# Patient Record
Sex: MALE | Race: WHITE | Employment: UNEMPLOYED | URBAN - METROPOLITAN AREA
[De-identification: names, ages, dates, MRNs, and addresses within clinical notes are randomized per-mention and may not be internally consistent; named-entity substitution may affect disease eponyms.]

---

## 2022-08-05 NOTE — PROGRESS NOTES
New Patient Abstract    Reason for Referral: Chronic embolism and thrombosis of deep veins of unspecified upper extremity    Referring Provider: Yari Funes MD    Primary Care Provider: Yari Funes MD    Family History of Cancer/Hematologic Disorders: None reported    Presenting Symptoms: Clusters of small mobile, non-tender nodules in bilateral upper arms    Narrative with recent with Results/Procedures/Biopsies and Dates completed: Mr. Catalina Morrison is a 23-year-old white male with a history of depression and anxiety. He presented to his PCP on 6/2/22 reporting non-painful lumps under the skin in both upper arms. Physical assessment confirmed the presence of clusters of small mobile, non-tender nodules in bilateral upper arms with more noted on LUE than RUE. Ultrasound of the left upper arm was ordered and performed on 7/5/22 revealing probable chronic thrombus in the anterior brachial vein over a 10-15 cm segment with patency otherwise noted in the LUE. PCP noted that patient was asymptomatic, but etiology of clot was unknown. Patient does not smoke and has no documented family or personal history of DVT. Of note, patient did receive one dose of the J&J COVID shot on 6/22/21. Mr. Catalina Morrison is now referred to [facility], per primary care, for hematology evaluation and treatment of chronic DVT of upper extremity. Patient was not started on anticoagulation. US- UPPER VENOUS LEFT 7/5/22        Notes from Referring Provider: probable chronic thrombus Left anterior brachial vein- asymptomatic but no known reason for clot.     Other Pertinent Information:       Presented at Tumor Board:  N/A

## 2022-08-09 ENCOUNTER — OFFICE VISIT (OUTPATIENT)
Dept: ONCOLOGY | Age: 22
End: 2022-08-09
Payer: COMMERCIAL

## 2022-08-09 ENCOUNTER — HOSPITAL ENCOUNTER (OUTPATIENT)
Dept: LAB | Age: 22
Discharge: HOME | End: 2022-08-12
Payer: COMMERCIAL

## 2022-08-09 VITALS
SYSTOLIC BLOOD PRESSURE: 130 MMHG | HEART RATE: 78 BPM | BODY MASS INDEX: 33.53 KG/M2 | OXYGEN SATURATION: 100 % | WEIGHT: 213.6 LBS | HEIGHT: 67 IN | DIASTOLIC BLOOD PRESSURE: 80 MMHG | RESPIRATION RATE: 21 BRPM | TEMPERATURE: 98 F

## 2022-08-09 DIAGNOSIS — I82.622 ACUTE DEEP VEIN THROMBOSIS (DVT) OF BRACHIAL VEIN OF LEFT UPPER EXTREMITY (HCC): ICD-10-CM

## 2022-08-09 DIAGNOSIS — I82.622 DEEP VEIN THROMBOSIS (DVT) OF LEFT UPPER EXTREMITY, UNSPECIFIED CHRONICITY, UNSPECIFIED VEIN (HCC): Primary | ICD-10-CM

## 2022-08-09 DIAGNOSIS — R22.9 LUMPS ON THE SKIN: ICD-10-CM

## 2022-08-09 DIAGNOSIS — I82.622 DEEP VEIN THROMBOSIS (DVT) OF LEFT UPPER EXTREMITY, UNSPECIFIED CHRONICITY, UNSPECIFIED VEIN (HCC): ICD-10-CM

## 2022-08-09 PROBLEM — I82.629 ACUTE DEEP VEIN THROMBOSIS (DVT) OF BRACHIAL VEIN (HCC): Status: ACTIVE | Noted: 2022-08-09

## 2022-08-09 LAB
ABO + RH BLD: NORMAL
ALBUMIN SERPL-MCNC: 4.2 G/DL (ref 3.5–5)
ALBUMIN/GLOB SERPL: 1.3 {RATIO} (ref 1.2–3.5)
ALP SERPL-CCNC: 52 U/L (ref 50–136)
ALT SERPL-CCNC: 46 U/L (ref 12–65)
ANION GAP SERPL CALC-SCNC: 6 MMOL/L (ref 7–16)
APTT 1H P INC PPP: NORMAL SEC
APTT IMM NP PPP: NORMAL SEC
APTT PPP: 22.1 SEC (ref 24.1–35.1)
APTT PPP: 25.1 SEC (ref 23.4–34.5)
AST SERPL-CCNC: 20 U/L (ref 15–37)
BASOPHILS # BLD: 0.1 K/UL (ref 0–0.2)
BASOPHILS NFR BLD: 1 % (ref 0–2)
BILIRUB SERPL-MCNC: 0.9 MG/DL (ref 0.2–1.1)
BUN SERPL-MCNC: 15 MG/DL (ref 6–23)
CALCIUM SERPL-MCNC: 9.5 MG/DL (ref 8.3–10.4)
CHLORIDE SERPL-SCNC: 105 MMOL/L (ref 98–107)
CO2 SERPL-SCNC: 27 MMOL/L (ref 21–32)
CREAT SERPL-MCNC: 1.1 MG/DL (ref 0.8–1.5)
D DIMER PPP FEU-MCNC: <0.27 UG/ML(FEU)
DIFFERENTIAL METHOD BLD: ABNORMAL
EOSINOPHIL # BLD: 0.2 K/UL (ref 0–0.8)
EOSINOPHIL NFR BLD: 3 % (ref 0.5–7.8)
ERYTHROCYTE [DISTWIDTH] IN BLOOD BY AUTOMATED COUNT: 11.6 % (ref 11.9–14.6)
FIBRINOGEN PPP-MCNC: 250 MG/DL (ref 190–501)
GLOBULIN SER CALC-MCNC: 3.2 G/DL (ref 2.3–3.5)
GLUCOSE SERPL-MCNC: 82 MG/DL (ref 65–100)
HCT VFR BLD AUTO: 47.2 %
HGB BLD-MCNC: 16.4 G/DL (ref 13.6–17.2)
IMM GRANULOCYTES # BLD AUTO: 0 K/UL (ref 0–0.5)
IMM GRANULOCYTES NFR BLD AUTO: 0 % (ref 0–5)
INR PPP: 1.1
LYMPHOCYTES # BLD: 1.3 K/UL (ref 0.5–4.6)
LYMPHOCYTES NFR BLD: 26 % (ref 13–44)
Lab: NORMAL
MCH RBC QN AUTO: 31.4 PG (ref 26.1–32.9)
MCHC RBC AUTO-ENTMCNC: 34.7 G/DL (ref 31.4–35)
MCV RBC AUTO: 90.2 FL (ref 79.6–97.8)
MONOCYTES # BLD: 0.6 K/UL (ref 0.1–1.3)
MONOCYTES NFR BLD: 12 % (ref 4–12)
NEUTS SEG # BLD: 2.8 K/UL (ref 1.7–8.2)
NEUTS SEG NFR BLD: 57 % (ref 43–78)
NRBC # BLD: 0 K/UL (ref 0–0.2)
PLATELET # BLD AUTO: 210 K/UL (ref 150–450)
PMV BLD AUTO: 12 FL (ref 9.4–12.3)
POTASSIUM SERPL-SCNC: 3.8 MMOL/L (ref 3.5–5.1)
PROT SERPL-MCNC: 7.4 G/DL (ref 6.3–8.2)
PROTHROMBIN TIME: 13.9 SEC (ref 12.6–14.5)
PTT MIXING STUDY: NORMAL
RBC # BLD AUTO: 5.23 M/UL (ref 4.23–5.6)
REFERENCE LAB: NORMAL
SODIUM SERPL-SCNC: 138 MMOL/L (ref 136–145)
THROMBIN TIME: 22 SECS (ref 15.4–17.9)
WBC # BLD AUTO: 4.8 K/UL (ref 4.3–11.1)

## 2022-08-09 PROCEDURE — 85302 CLOT INHIBIT PROT C ANTIGEN: CPT

## 2022-08-09 PROCEDURE — 85730 THROMBOPLASTIN TIME PARTIAL: CPT

## 2022-08-09 PROCEDURE — 85303 CLOT INHIBIT PROT C ACTIVITY: CPT

## 2022-08-09 PROCEDURE — 85240 CLOT FACTOR VIII AHG 1 STAGE: CPT

## 2022-08-09 PROCEDURE — 85384 FIBRINOGEN ACTIVITY: CPT

## 2022-08-09 PROCEDURE — 80053 COMPREHEN METABOLIC PANEL: CPT

## 2022-08-09 PROCEDURE — 83695 ASSAY OF LIPOPROTEIN(A): CPT

## 2022-08-09 PROCEDURE — 86146 BETA-2 GLYCOPROTEIN ANTIBODY: CPT

## 2022-08-09 PROCEDURE — 83090 ASSAY OF HOMOCYSTEINE: CPT

## 2022-08-09 PROCEDURE — 81240 F2 GENE: CPT

## 2022-08-09 PROCEDURE — 86147 CARDIOLIPIN ANTIBODY EA IG: CPT

## 2022-08-09 PROCEDURE — 99205 OFFICE O/P NEW HI 60 MIN: CPT | Performed by: PEDIATRICS

## 2022-08-09 PROCEDURE — 85379 FIBRIN DEGRADATION QUANT: CPT

## 2022-08-09 PROCEDURE — 86900 BLOOD TYPING SEROLOGIC ABO: CPT

## 2022-08-09 PROCEDURE — 85300 ANTITHROMBIN III ACTIVITY: CPT

## 2022-08-09 PROCEDURE — 81241 F5 GENE: CPT

## 2022-08-09 PROCEDURE — 86769 SARS-COV-2 COVID-19 ANTIBODY: CPT

## 2022-08-09 PROCEDURE — 85670 THROMBIN TIME PLASMA: CPT

## 2022-08-09 PROCEDURE — 85732 THROMBOPLASTIN TIME PARTIAL: CPT

## 2022-08-09 PROCEDURE — 36415 COLL VENOUS BLD VENIPUNCTURE: CPT

## 2022-08-09 PROCEDURE — 86038 ANTINUCLEAR ANTIBODIES: CPT

## 2022-08-09 PROCEDURE — 85305 CLOT INHIBIT PROT S TOTAL: CPT

## 2022-08-09 PROCEDURE — 85306 CLOT INHIBIT PROT S FREE: CPT

## 2022-08-09 PROCEDURE — 85025 COMPLETE CBC W/AUTO DIFF WBC: CPT

## 2022-08-09 PROCEDURE — 85610 PROTHROMBIN TIME: CPT

## 2022-08-09 RX ORDER — ALPRAZOLAM 1 MG/1
1 TABLET ORAL DAILY
COMMUNITY

## 2022-08-09 RX ORDER — SERTRALINE HYDROCHLORIDE 100 MG/1
100 TABLET, FILM COATED ORAL DAILY
COMMUNITY

## 2022-08-09 ASSESSMENT — PATIENT HEALTH QUESTIONNAIRE - PHQ9
SUM OF ALL RESPONSES TO PHQ9 QUESTIONS 1 & 2: 2
SUM OF ALL RESPONSES TO PHQ QUESTIONS 1-9: 2
2. FEELING DOWN, DEPRESSED OR HOPELESS: 1
SUM OF ALL RESPONSES TO PHQ QUESTIONS 1-9: 2
SUM OF ALL RESPONSES TO PHQ QUESTIONS 1-9: 2
1. LITTLE INTEREST OR PLEASURE IN DOING THINGS: 1
SUM OF ALL RESPONSES TO PHQ QUESTIONS 1-9: 2

## 2022-08-09 NOTE — PATIENT INSTRUCTIONS
Patient Instructions from Today's Visit    Reason for Visit:  New patient visit for thrombosis (DVT) left arm  (Incidental finding while evaluating \"lumps on arms for years\": Ultrasound in July 2022)  Was placed on Aspirin 81mg by mouth daily and referred to Hematology       Plan:      Dr Lyla Boast feels that the \"lumps\" that are felt on your arms are fatty tissue/subcutaneous tissue. He does not think they are lymph nodes. The blood clot they found on ultrasound of your left arm is in a deep vein. This is called a deep vein thrombosis. Typically the recommendation for treatment would be to be treated with anticoagulation/blood thinner. It looks like it has been there for a while called chronic clot. Dr Lyla Boast recommends a lab work up looking to evaluate why you may have had a clot in the first place. Dr Lyla Boast would like you to have a chest xray. He would also like to reeavaluate your left arm but also get an ultrasound of your right arm too. You can have those done this week. If your ddimer is positive he would recommend treating you with anticoagulation for 3 months. If your ddimer is normal, and your ultrasound is not worse than your last check then likely just continue aspirin. Follow Up:  1 month     Recent Lab Results:  Labs after visit today    Treatment Summary has been discussed and given to patient: NA        -------------------------------------------------------------------------------------------------------------------  Please call our office at [phone] if you have any  of the following symptoms:   Fever of 100.5 or greater  Chills  Shortness of breath  Swelling or pain in one leg    After office hours an answering service is available and will contact a provider for emergencies or if you are experiencing any of the above symptoms. Patient has My Chart. My Chart log in information explained on the after visit summary printout at the [facility] desk.

## 2022-08-09 NOTE — PROGRESS NOTES
HISTORY OF PRESENT ILLNESS  Luis Barker is a 19 y.o. y.o. male with VTE    ABSTRACT  New Patient Abstract    Reason for Referral: Chronic embolism and thrombosis of deep veins of unspecified upper extremity    Referring Provider: Dustin Madrid MD    Primary Care Provider: Dustin Madrid MD    Family History of Cancer/Hematologic Disorders: None reported    Presenting Symptoms: Clusters of small mobile, non-tender nodules in bilateral upper arms    Narrative with recent with Results/Procedures/Biopsies and Dates completed: Mr. Kyra Hull is a 22-year-old white male with a history of depression and anxiety. He presented to his PCP on 6/2/22 reporting non-painful lumps under the skin in both upper arms. Physical assessment confirmed the presence of clusters of small mobile, non-tender nodules in bilateral upper arms with more noted on LUE than RUE. Ultrasound of the left upper arm was ordered and performed on 7/5/22 revealing probable chronic thrombus in the anterior brachial vein over a 10-15 cm segment with patency otherwise noted in the LUE. PCP noted that patient was asymptomatic, but etiology of clot was unknown. Patient does not smoke and has no documented family or personal history of DVT. Of note, patient did receive one dose of the J&J COVID shot on 6/22/21. Mr. Kyra Hull is now referred to [facility], per primary care, for hematology evaluation and treatment of chronic DVT of upper extremity. Patient was not started on anticoagulation. US- UPPER VENOUS LEFT 7/5/22        Notes from Referring Provider: probable chronic thrombus Left anterior brachial vein- asymptomatic but no known reason for clot. 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 sertraline (ZOLOFT) 100 MG tablet Take 100 mg by mouth in the morning. No current facility-administered medications for this visit. No past medical history on file. No past surgical history on file. No family history on file. Social History     Tobacco Use    Smoking status: Never    Smokeless tobacco: Never   Substance Use Topics    Alcohol use: Never    Drug use: Never       Immunization History   Administered Date(s) Administered    Influenza Quadv 11/03/2015       No Known Allergies      Review of Systems   Review of Systems   Constitutional: Negative. HENT: Negative. Eyes: Negative. Respiratory: Negative. Cardiovascular: Negative. Gastrointestinal: Negative. Genitourinary: Negative. Musculoskeletal: Negative. Skin: Negative. Neurological: Negative. Endo/Heme/Allergies: Negative. Psychiatric/Behavioral: Negative. Pain reviewed fully and addressed this visit  Med review and reconciliation addressed fully this visit  ADLs and performance level addressed, ECOG 0 unless otherwise addressed    Blood pressure 130/80, pulse 78, temperature 98 °F (36.7 °C), temperature source Oral, resp. rate 21, height 5' 7.32\" (1.71 m), weight 213 lb 9.6 oz (96.9 kg), SpO2 100 %. Physical Exam:   Constitutional: Well developed, well nourished male in no acute distress, sitting comfortably   HEENT: Normocephalic and atraumatic.  Oropharynx is clear, mucous membranes are moist.  Pupils are equal, round, and reactive to light. Extraocular muscles are intact. Sclerae anicteric. Neck supple without JVD. No thyromegaly present. Lymph node   No palpable submandibular, cervical, supraclavicular, axillary or inguinal lymph nodes. Skin Warm and dry. No bruising and no rash noted. No erythema. No pallor.   -mild prominent surface veins chest wall, right >left   Respiratory Lungs are clear to auscultation bilaterally without wheezes, rales or rhonchi, normal air exchange without accessory muscle use. CVS Normal rate, regular rhythm and normal S1 and S2. No murmurs, gallops, or rubs. Abdomen Soft, nontender and nondistended, normoactive bowel sounds. No palpable mass. No hepatosplenomegaly. Neuro Grossly nonfocal with no obvious sensory or motor deficits. MSK Normal range of motion in general.  No edema and no tenderness. PS ECOG = 0   Psych Appropriate mood and affect. No visits with results within 3 Day(s) from this visit. Latest known visit with results is:   No results found for any previous visit. Radiology:  CT Results (most recent):  No results found for this or any previous visit from the past 365 days. PET Results (most recent):  No results found for this or any previous visit from the past 365 days. JANE Results (most recent):  No results found for this or any previous visit from the past 365 days. US  No results found for this or any previous visit from the past 365 days.          Patient Active Problem List   Diagnosis    Anxiety    Acute deep vein thrombosis (DVT) of brachial vein (HCC)   Likely chronic by US and lack of symptoms    ASSESSMENT and PLAN  18 yo with incidental finding of what appears to be chronic extended DVT of left anterior brachial v with no antecedent injury or associated abnormalities c/w paget schroetter (weight lifting, physical activity, discernable malformation, cxr pending) on ASA 81 mg after discovery of clot on US for superficial lumps bilateral.  1) chronic DVT LUE: rule out paget schroetter (cervical rib or other abnormality causing compression)  -repeat bilateral UE US given 6 weeks since last and pt.  Has prominent vasculature suggesting collateral formation; consider CTA or MRV of chest pending eval  2) thrombophilia testing recommended and discussed  3) continue ASA  -if US shows progression, switch to DOAC  -if DDIMER elevated, switch to [id number]    Reviewed symptoms of DVT and PE and advised to call if any; prevention and high risk situations discussed    Rtc 1 month  Total time 70 min 50% in direct consultation about the patient's diagnosis and management  Antoinette Pop MD  Director, Adolescent Young Adult [address] [facility] and [facility]  [address] [facility]  [address] [facility], [address] [place] [address] Phone

## 2022-08-10 LAB
HCYS SERPL-SCNC: 9.2 UMOL/L (ref 0–14.5)
LPA SERPL-SCNC: 204.9 NMOL/L

## 2022-08-11 ENCOUNTER — HOSPITAL ENCOUNTER (OUTPATIENT)
Dept: ULTRASOUND IMAGING | Age: 22
Discharge: HOME | End: 2022-08-14

## 2022-08-11 ENCOUNTER — CLINICAL DOCUMENTATION (OUTPATIENT)
Dept: ONCOLOGY | Age: 22
End: 2022-08-11

## 2022-08-11 ENCOUNTER — HOSPITAL ENCOUNTER (OUTPATIENT)
Dept: GENERAL RADIOLOGY | Age: 22
Discharge: HOME | End: 2022-08-14

## 2022-08-11 DIAGNOSIS — R22.9 LUMPS ON THE SKIN: ICD-10-CM

## 2022-08-11 DIAGNOSIS — I82.622 ACUTE DEEP VEIN THROMBOSIS (DVT) OF BRACHIAL VEIN OF LEFT UPPER EXTREMITY (HCC): ICD-10-CM

## 2022-08-11 DIAGNOSIS — I82.622 DEEP VEIN THROMBOSIS (DVT) OF LEFT UPPER EXTREMITY, UNSPECIFIED CHRONICITY, UNSPECIFIED VEIN (HCC): ICD-10-CM

## 2022-08-11 LAB
B2 GLYCOPROT1 IGA SER-ACNC: <9 GPI IGA UNITS (ref 0–25)
B2 GLYCOPROT1 IGG SER-ACNC: <9 GPI IGG UNITS (ref 0–20)
B2 GLYCOPROT1 IGM SER-ACNC: <9 GPI IGM UNITS (ref 0–32)
CARDIOLIPIN IGA SER IA-ACNC: <9 APL U/ML (ref 0–11)
CARDIOLIPIN IGG SER IA-ACNC: 9 GPL U/ML (ref 0–14)
CARDIOLIPIN IGM SER IA-ACNC: <9 MPL U/ML (ref 0–12)
FACT VIII ACT/NOR PPP: 111 % (ref 56–140)
INTERPRETATION: NORMAL
PROT C AG PPP IA-ACNC: 90 % (ref 60–150)
VWF AG ACT/NOR PPP IA: 95 % (ref 50–200)
VWF:RCO ACT/NOR PPP PL AGG: 73 % (ref 50–200)

## 2022-08-11 NOTE — PROGRESS NOTES
Stat call from [place] [id number] in [address]:  patient is Negative for DVT in both arms. Msg to Dr Stephanie Davis and Maxine Elizabeth.

## 2022-08-12 LAB
F5 P.R506Q BLD/T QL: NORMAL
SARS-COV-2 AB SERPL QL IA: NEGATIVE

## 2022-08-15 LAB
ANA SER QL: NEGATIVE
F2 GENE MUT ANL BLD/T: NORMAL

## 2022-08-18 LAB
APTT SCREEN TO CONFIRM RATIO: 1.2 RATIO (ref 0–1.34)
AT III AG PPP IA-ACNC: 91 % (ref 72–124)
AT III PPP CHRO-ACNC: 108 % (ref 75–135)
CONFIRM APTT/NORMAL: 46.3 SEC (ref 0–47.6)
FACT VIII ACT/NOR PPP: 102 % (ref 56–140)
LA 2 SCREEN W REFLEX-IMP: NORMAL
PROT C PPP-ACNC: 105 % (ref 73–180)
PROT S ACT/NOR PPP: 102 % (ref 63–140)
PROT S AG ACT/NOR PPP IA: 86 % (ref 60–150)
PROT S FREE AG ACT/NOR PPP IA: 107 % (ref 61–136)
SCREEN APTT: 32.9 SEC (ref 0–51.9)
SCREEN DRVVT: 43.7 SEC (ref 0–47)
THROMBIN TIME: 18.8 SEC (ref 0–23)

## 2022-09-15 ENCOUNTER — OFFICE VISIT (OUTPATIENT)
Dept: ONCOLOGY | Age: 22
End: 2022-09-15
Payer: COMMERCIAL

## 2022-09-15 VITALS
HEIGHT: 67 IN | HEART RATE: 92 BPM | SYSTOLIC BLOOD PRESSURE: 145 MMHG | RESPIRATION RATE: 14 BRPM | OXYGEN SATURATION: 99 % | BODY MASS INDEX: 33.54 KG/M2 | TEMPERATURE: 98.5 F | WEIGHT: 213.7 LBS | DIASTOLIC BLOOD PRESSURE: 83 MMHG

## 2022-09-15 DIAGNOSIS — I82.622 DEEP VEIN THROMBOSIS (DVT) OF LEFT UPPER EXTREMITY, UNSPECIFIED CHRONICITY, UNSPECIFIED VEIN (HCC): Primary | ICD-10-CM

## 2022-09-15 PROCEDURE — 99214 OFFICE O/P EST MOD 30 MIN: CPT | Performed by: PEDIATRICS

## 2022-09-15 ASSESSMENT — PATIENT HEALTH QUESTIONNAIRE - PHQ9
SUM OF ALL RESPONSES TO PHQ9 QUESTIONS 1 & 2: 0
SUM OF ALL RESPONSES TO PHQ QUESTIONS 1-9: 0
1. LITTLE INTEREST OR PLEASURE IN DOING THINGS: 0
2. FEELING DOWN, DEPRESSED OR HOPELESS: 0
SUM OF ALL RESPONSES TO PHQ QUESTIONS 1-9: 0

## 2022-09-15 NOTE — PROGRESS NOTES
Celia Joy  (: 41-) Thrombosis Lab Flow Sheet   First Tier 2022   Prothrombin [id number] Mutation Negative   Factor 5 Leiden Negative   Lupus Anticoag panel Not Detected   Homocysteine 9.2   Lipoprotein (a) 204.9 (H)   Protein C, Ag 90   Protein C, Act 105   Protein S Ag, Free 107   Protein S, Act (functional) 102   Protein S, Total 86   Cardiolipin Ab, IgG 9   Cardiolipin Ab, IgM <9   Cardiolipin Ab, IgA <9   Antithrombin Activity 108   Antithrombin Antigen 91   Beta-2 Glycoprotein I Ab, IgG <9   Beta-2 Glycoprotein I Ab, IgM <9   Beta-2 Glycoprotein I Ab, IgA <9   Ddimer <.27   Factor 8 Activity 102   Kendra, Direct Negative   Fibrinogen 250   Thrombin Time 22 (H)   Blood Type A+   Ángel 2 Negative   CALR Negative   MPL Negative   BCR ABL Negative   SARS-COVID Ab Total (IgG, IgM, IgA) Negative       Second Tier    Alpha 2 Plasmin Inhibitor    Plasminogen Activity    Plasminogen Activator Inhibitor 1

## 2022-09-15 NOTE — PROGRESS NOTES
HISTORY OF PRESENT ILLNESS  Mortimer Samuel is a 19 y.o. y.o. male with VTE    ABSTRACT  New Patient Abstract    Reason for Referral: Chronic embolism and thrombosis of deep veins of unspecified upper extremity    Referring Provider: Stefany Blackwood MD    Primary Care Provider: Stefany Blackwood MD    Family History of Cancer/Hematologic Disorders: None reported    Presenting Symptoms: Clusters of small mobile, non-tender nodules in bilateral upper arms    Narrative with recent with Results/Procedures/Biopsies and Dates completed: Mr. Sally Cisneros is a 40-year-old white male with a history of depression and anxiety. He presented to his PCP on 6/2/22 reporting non-painful lumps under the skin in both upper arms. Physical assessment confirmed the presence of clusters of small mobile, non-tender nodules in bilateral upper arms with more noted on LUE than RUE. Ultrasound of the left upper arm was ordered and performed on 7/5/22 revealing probable chronic thrombus in the anterior brachial vein over a 10-15 cm segment with patency otherwise noted in the LUE. PCP noted that patient was asymptomatic, but etiology of clot was unknown. Patient does not smoke and has no documented family or personal history of DVT. Of note, patient did receive one dose of the J&J COVID shot on 6/22/21. Mr. Sally Cisneros is now referred to [facility], per primary care, for hematology evaluation and treatment of chronic DVT of upper extremity. Patient was not started on anticoagulation. US- UPPER VENOUS LEFT 7/5/22        Notes from Referring Provider: probable chronic thrombus Left anterior brachial vein- asymptomatic but no known reason for clot. 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 sertraline (ZOLOFT) 100 MG tablet Take 100 mg by mouth in the morning. No current facility-administered medications for this visit. No past medical history on file. No past surgical history on file. Family History   Problem Relation Age of Onset    Transient ischemic attack  Mother     Obesity Father     Deep Vein Thrombosis Paternal Grandfather        Social History     Tobacco Use    Smoking status: Never    Smokeless tobacco: Never   Substance Use Topics    Alcohol use: Never    Drug use: Never       Immunization History   Administered Date(s) Administered    Influenza Quadv 11/03/2015       No Known Allergies      Review of Systems   Review of Systems   Constitutional: Negative. HENT: Negative. Eyes: Negative. Respiratory: Negative. Cardiovascular: Negative. Gastrointestinal: Negative. Genitourinary: Negative. Musculoskeletal: Negative. Skin: Negative. Neurological: Negative. Endo/Heme/Allergies: Negative. Psychiatric/Behavioral: Negative. Pain reviewed fully and addressed this visit  Med review and reconciliation addressed fully this visit  ADLs and performance level addressed, ECOG 0 unless otherwise addressed    Blood pressure (!) 145/83, pulse 92, temperature 98.5 °F (36.9 °C), temperature source Oral, resp. rate 14, height 5' 7.32\" (1.71 m), weight 213 lb 11.2 oz (96.9 kg), SpO2 99 %.         Physical Exam:   Constitutional: Well developed, well nourished male in no acute distress, sitting comfortably   HEENT: Normocephalic and atraumatic. Oropharynx is clear, mucous membranes are moist.  Pupils are equal, round, and reactive to light. Extraocular muscles are intact. Sclerae anicteric. Neck supple without JVD. No thyromegaly present. Lymph node   No palpable submandibular, cervical, supraclavicular, axillary or inguinal lymph nodes. Skin Warm and dry. No bruising and no rash noted. No erythema. No pallor.   -mild prominent surface veins chest wall, right >left   Respiratory Lungs are clear to auscultation bilaterally without wheezes, rales or rhonchi, normal air exchange without accessory muscle use. CVS Normal rate, regular rhythm and normal S1 and S2. No murmurs, gallops, or rubs. Abdomen Soft, nontender and nondistended, normoactive bowel sounds. No palpable mass. No hepatosplenomegaly. Neuro Grossly nonfocal with no obvious sensory or motor deficits. MSK Normal range of motion in general.  No edema and no tenderness. PS ECOG = 0   Psych Appropriate mood and affect. No visits with results within 3 Day(s) from this visit. Latest known visit with results is:   Hospital Outpatient Visit on 08/09/2022   Component Date Value Ref Range Status    DILUTE PROTHROMBIN TIME (dPT) 08/09/2022 46.3  0.0 - 47.6 sec Final    Dilute Prothrombin Time Confirm Ra* 08/09/2022 1.20  0.00 - 1.34 Ratio Final    Thrombin Time 08/09/2022 18.8  0.0 - 23.0 sec Final    PTT-LA 08/09/2022 32.9  0.0 - 51.9 sec Final    dRVVT 08/09/2022 43.7  0.0 - 47.0 sec Final    Comment: (NOTE)  Performed At: [facility] & [address] [id number]  Tali Amato MD WS:[id number]      Interpretation 08/09/2022 Comment:   Final    Comment: (NOTE)  No lupus anticoagulant was detected.   Performed At: [facility] & [facility] [address] [place], [place] [id number]  Tali Amato MD ML:[id number]      Factor II, DNA Analysis 08/09/2022 Comment    Final    Comment: (NOTE)  Result: c.*97G>A - Not Detected  This result is not associated with an increased risk for venous  thromboembolism. See Additional Clinical Information and  Comments. Additional Clinical Information:  Venous thromboembolism is a multifactorial disease influenced by  genetic, environmental, and circumstantial risk factors. The c.*97G>A  variant in the F2 gene is a genetic risk factor for venous  thromboembolism. Heterozygous carriers have a 2- to 4-fold increased  risk for venous thromboembolism. Homozygotes for the c.*97G>A variant  are rare. The annual risk of VTE in homozygotes has been reported to  be 1.1%/year. Individuals who carry both a c.*97G>A variant in the  F2 gene and a c.1601G>A (p. Krv386Qco) variant in the F5 gene  (commonly referred to as Factor V Leiden) have an approximately 20-  fold increased risk for venous thromboembolism. Risks are likely to  be even higher in more complex genotype combinations involving the  F2 c.*97G>A variant and Factor V Leiden (PMID:                            03436105). Additional  risk factors include but are not limited to: deficiency of protein C,  protein S, or antithrombin III, age, male sex, personal or family  history of deep vein thromboembolism, smoking, surgery, prolonged  immobilization, malignant neoplasm, tamoxifen treatment, raloxifene  treatment, oral contraceptive use, hormone replacement therapy, and  pregnancy. Management of thrombotic risk and thrombotic events should  follow established guidelines and fit the clinical circumstance. This  result cannot predict the occurrence or recurrence of a thrombotic  event. Comments:  Genetic counseling is recommended to discuss the potential clinical  implications of positive results, as well as recommendations for  testing family members. Genetic Coordinators are available for health care providers to  discuss  results at [phone] ([phone]).   Test Details:  Variant analyzed: c.*97G>A, previously referred to as [id number]  Methods/Limitations:  DNA analysis of the F2 gene (NM_000                           506.5) was performed by PCR  amplification followed by restriction enzyme analysis. The diagnostic  sensitivity is >99%. Results must be combined with clinical  information for the most accurate interpretation. Molecular-based  testing is highly accurate, but as in any laboratory test, diagnostic  errors may occur. False positive or false negative results may occur  for reasons that include genetic variants, blood transfusions, bone  marrow transplantation, somatic or tissue-specific mosaicism,  mislabeled samples, or erroneous representation of family  relationships. This test was developed and its performance characteristics determined  by Joel Howell. It has not been cleared or approved by the Food and Drug  Administration. References:  Brittney Hankinser, Sabrina Smoker; [facility] Professional  Practice and Guidelines Committee. Addendum: Edward Armstrong. consensus statement on factor V Leiden mutation  testing. [employer]. 2021 Mar 5. doi: 46.5973/U612                           -77892-x. PMID: 48787000. Don Ford. Prothrombin Thrombophilia. 2006 Jul 25  [Updated 2021 Feb 4]. In: Rosamaria Collier, Parish HH, Destini RA, et al.,  editors. Celi(R) [Internet]. [facility]): [facility], [facility]; [phone]. Available from:  [facility].  Ti Phlegm, Deena Dukes CS;  [facility] Laboratory  Committee. Venous thromboembolism  laboratory testing (factor V Leiden and factor II c.*97G>A),  2018 update: a technical standard of the [address] ([facility]). [employer]. 2018 Dec;20(12):4563-7522.  doi: 21.3056/[id number]-K. Epub 2018 Oct 5. PMID: 07182004.   Dexter Justin, PhD, Constantino Grimaldo, PhD  Fred Frausto, PhD, Obed Ghosh, PhD, Bill Aase Rahda, PhD, Ryan Arzate, PhD, Fior Altman, PhD, Jonathon Mead, PhD, [facility] Center[facility].  Performed At: [place] [id number]  Diane Coelho [facility] XD:[id number]      Factor V Leiden Mutation 08/09/2022 Comment    Final    Comment: (NOTE)  Result: c.1601G>A (p.Ipo904Ytq) - Not Detected  This result is not associated with an increased risk for venous  thromboembolism. See Additional Clinical Information and  Comments. Additional Clinical Information:  Venous thromboembolism is a multifactorial disease influenced by  genetic, environmental, and circumstantial risk factors. The  c.1601G>A (p. Cvu484Cth) variant in the F5 gene, commonly referred to  as Factor V Leiden, is a genetic risk factor for venous  thromboembolism. Heterozygous carriers of this variant have a 6- to 8-  fold increased risk for venous thromboembolism. Individuals  homozygous for this variant (ie, with a copy of the variant on each  chromosome) have an approximately 80-fold increased risk for venous  thromboembolism. Individuals who carry both a c.*97G>A variant in the  F2 gene and Factor V Leiden have an approximately 20-fold increased  risk for venous thromboembolism. Risks are likely to be even higher  in more complex genotype combinations in                           volving the F2 c.*97G>A  variant and Factor V Leiden (PMID: 65479510). Additional risk factors  include but are not limited to: deficiency of protein C, protein S,  or antithrombin III, age, male sex, personal or family history of  deep vein thromboembolism, smoking, surgery, prolonged  immobilization, malignant neoplasm, tamoxifen treatment, raloxifene  treatment, oral contraceptive use, hormone replacement therapy, and  pregnancy. Management of thrombotic risk and thrombotic events should  follow established guidelines and fit the clinical circumstance.  This  result cannot predict the occurrence or recurrence of a thrombotic  event. Comment:  Genetic counseling is recommended to discuss the potential clinical  implications of positive results, as well as recommendations for  testing family members. Genetic Coordinators are available for health care providers to  discuss results at [phone] Test Details:  Variant Analyzed: c.1601G>A (p. [id number]), referred to as Fact                           or V  Leiden  Methods/Limitations:  DNA analysis of the F5 gene (NM_000130.5) was performed by PCR  amplification followed by restriction enzyme analysis. The  diagnostic sensitivity is >99%. Results must be combined with  clinical information for the most accurate interpretation. Molecular-  based testing is highly accurate, but as in any laboratory test,  diagnostic errors may occur. False positive or false negative results  may occur for reasons that include genetic variants, blood  transfusions, bone marrow transplantation, somatic or tissue-specific  mosaicism, mislabeled samples, or erroneous representation of family  relationships. This test was developed and its performance characteristics  determined by Joel Howell. It has not been cleared or approved by the  Food and Drug Administration. References:  Sabrina Rudd; [facility]  Professional Practice and Guidelines Committee. Addendum: [address] consensus statement on fac                           tor V Leiden  mutation testing. [employer] [facility]. 2021 Mar 5. doi: 06.7543/[id number]-  77986-t. PMID: 01437001. Don Ford. Factor V Leiden Thrombophilia. 1999 May 14  [Updated 2018 Jan 4]. In: Rosamaria Collier, Parish HH, Destini RA, et al.,  editors. Celi(R) [Internet]. [place] [address] ([facility]): [facility], [place] [address]; 3531-4052.  Available from:  Armando Parra Athena Ka CS;  [facility] Laboratory  Committee. Venous thromboembolism  laboratory testing (factor V Leiden and factor II c.*97G>A), 2018  update: a technical standard of the [address] ([facility]). [employer] [facility]. 2018 Dec;20(12):5524-6589.  doi: 32.9043/E19751-311-7739-P. Epub 2018 Oct 5. PMID: 19038382. Connor Patterson, PhD, Zane Torres, PhD  Prasad Boyd, PhD, Bradley Bamberger, PhD, Bret Singer, PhD, Haily Chacon, PhD, Cristino Sandhoff, PhD, Gio Gómez,                            PhD, [facility]  Performed At: [address] [id number]  Emir Alxe [facility] OL:[id number]      Antithrombin Activity 08/09/2022 108  75 - 135 % Final    Comment: (NOTE)  Direct Xa inhibitor anticoagulants such as rivaroxaban, apixaban and  edoxaban will lead to spuriously elevated antithrombin activity  levels possibly masking a deficiency. Anti-Throm Ag 08/09/2022 91  72 - 124 % Final    Comment: (NOTE)  This test was developed and its performance characteristics  determined by Keily Mendez. It has not been cleared or approved  by the Food and Drug Administration. Performed At: [facility] & [address] [id number]  Caesar Shepherd MD ZI:[id number]      Protein S, Functional 08/09/2022 102  63 - 140 % Final    Comment: (NOTE)  Protein S activity may be falsely increased (masking an abnormal, low  result) in patients receiving direct Xa inhibitor (e.g.,  rivaroxaban, apixaban, edoxaban) or a direct thrombin inhibitor  (e.g., dabigatran) anticoagulant treatment due to assay interference  by these drugs.   Performed At: [address] [id number]  Caesar Shepherd MD FP:[id number]      Protein S Ag, Total 08/09/2022 86  60 - 150 % Final    Comment: (NOTE)  This test was developed and its performance characteristics  determined by Keily Mendez. It has not been cleared or approved  by the Food and Drug Administration. Protein S Ag, Free 08/09/2022 107  61 - 136 % Final    Comment: (NOTE)  Performed At: [facility] & [address] [id number]  Lamar Fajardo MD UJ:[id number]      Protein C Antigen 08/09/2022 90  60 - 150 % Final    Comment: (NOTE)  Performed At: [facility] & [address] [id number]  Lamar Fajardo MD OR:[id number]      Beta-2 Glyco 1 IgA 08/09/2022 <9  0 - 25 GPI IgA units Final    Comment: (NOTE)  The reference interval reflects a 3SD or 99th percentile interval,  which is thought to represent a potentially clinically significant  result in accordance with the International Consensus Statement on  the classification criteria for definitive antiphospholipid  syndrome (APS). J Thromb Haem 2006;4:295-306.   Performed At: [facility] & [address] [id number]  Lamar Fajardo MD YV:[id number]      Cardiolipin IgA 08/09/2022 <9  0 - 11 APL U/mL Final    Comment: (NOTE)                           Negative:              <12                           Indeterminate:     12 - 20                           Low-Med Positive: >20 - 80                           High Positive:         >80  Performed At: [facility] & [address] [id number]  Lamar Fajardo MD PJ:[id number]      Cardiolipin Antibody, IgG 08/09/2022 9  0 - 14 GPL U/mL Final    Comment: (NOTE)                           Negative:              <15                           Indeterminate:     15 - 20                           Low-Med Positive: >20 - 80                           High Positive:         >80      Cardiolipin Antibody, IgM 08/09/2022 <9  0 - 12 MPL U/mL Final    Comment: (NOTE)                           Negative:              <13                           Indeterminate:     13 - 20                           Low-Med Positive: >20 - 80                           High Positive:         >80  Performed At: [facility] [address] Randall Razia  [facility] [address] Rg and laboratory  findings is recommended. Serologic results should not be used as  the sole basis to diagnose or exclude recent SARS-CoV-2 infection. This test has not been FDA cleared or approved. This test has  been authorized by FDA under an Emergency Use Authorization  (EUA). This test is only authorized for the duration of the  declaration that circumstances exist justifying the authorization  of emergency use of in vitro diagnostics for detection and/or  diagnosis of COVID-19 under Section 564(b)(1) of the Act, 21  U. S.C. 289ALG-1(X)(1), unless the authorization is terminated or  revoked sooner. This test has been authorized only for detecting  the presence of antibodies against SARS-CoV-2, not for any other  viruses or pathogens. This assay will not detect antibodies induced by the cu                           rrently  available SARS-CoV-2 vaccines. The current vaccines elicit  antibodies specific to the viral spike protein. Maria Alejandra Ni offers  two test codes that detect viral spike-specific antibodies:  325044 SARS-CoV-2 Semi-Quantitative Total Antibody, Matty and  999636 SARS-CoV-2 Antibody, IgG, Matty (Qualitative). Positive results with this SARS-CoV-2 Antibodies, Nucleocapsid  assay suggest recent or previous natural infection with  SARS-CoV-2. Performed At: [address] [id number]  Liudmila Davis MD NB:[id number]      [address] [place] [address] 08/09/2022 Negative  Negative   Final    Comment: (NOTE)  Performed At: [facility]  [address] [id number]  Liudmila Davis MD ET:[id number]      Anti b2-Glycoprotein IgG 08/09/2022 <9  0 - 20 GPI IgG units Final    Comment: (NOTE)  The reference interval reflects a 3SD or 99th percentile interval,  which is thought to represent a potentially clinically significant  result in accordance with the International Consensus Statement on  the classification criteria for definitive antiphospholipid  syndrome (APS).  Jan Eaton Haem 2006;4:295-306. Anti b2-Glycoprotein IgM 08/09/2022 <9  0 - 32 GPI IgM units Final    Comment: (NOTE)  The reference interval reflects a 3SD or 99th percentile interval,  which is thought to represent a potentially clinically significant  result in accordance with the International Consensus Statement on  the classification criteria for definitive antiphospholipid  syndrome (APS). J Thromb Haem 2006;4:295-306. Performed At: [facility] & [place] [address]., [address] [place], [place] [id number]  Caesar Shepherd MD YC:[id number]      Thrombin Time 08/09/2022 22.0 (A) 15.4 - 17.9 SECS Final    Interpretation 08/09/2022 Note   Final    Comment: (NOTE)  -------------------------------  COAGULATION:  VON WILLEBRAND FACTOR ASSESSMENT CURRENT RESULTS ASSESSMENT  The VWF:Ag is normal. The VWF:RCo is normal. The FVIII is  normal.  VON WILLEBRAND FACTOR ASSESSMENT CURRENT RESULTS  INTERPRETATION  -  These results are not consistent with a diagnosis of VWD  according to the current NHLBI guideline. VON WILLEBRAND FACTOR ASSESSMENT  -  Results may be falsely elevated and possibly falsely normal  as VWF and FVIII may increase in samples drawn from patients  (particularly children) who are visibly stressed at the time  of phlebotomy, as acute phase reactants, or in response to  certain drug therapies such as desmopressin. Repeat testing  may be necessary before excluding a diagnosis of VWD  especially if the clinical suspicion is high for an  underlying bleeding disorder. The setting for phlebotomy  should be as calm as possible and patients should be  encouraged to sit quietly prior to the blood draw. VON WILLEBRAND FACTOR ASSESSMENT                            DEFINITIONS  -  VWD - von Willebrand disease; VWF - von Willebrand factor;  VWF:Ag - VWF antigen; VWF:RCo - VWF ristocetin cofactor  activity; FVIII - factor VIII activity.   MEDICAL DIRECTOR:  For questions regarding panel interpretation, please contact  Chris Singletary Tj Webb M.D. at [facility] at  [phone].  -------------------------------  DISCLAIMER  These assessments and interpretations are provided as a  convenience in support of the physician-patient relationship  and are not intended to replace the physician's clinical  judgment. They are derived from national guidelines in  addition to other evidence and expert opinion. The clinician  should consider this information within the context of  clinical opinion and the individual patient. SEE GUIDANCE FOR VON WILLEBRAND FACTOR ASSESSMENT: (1) The  National Heart, Lung and Blood [place]. The Diagnosis,  Evaluation and Management of von Willebrand Disease. Meri Parsons MD: [facility] [facility] [employer] Publication                             . 2007. Available at  http://rich.org/. (2) Anisa Pedraza et  al. [facility] J Hematol. 2009; 84(6):366-370. (3) [facility]. 2004;10(3):199-217. (4) Christian SANCHEZ et al.  Haemophilia. 2004; 10(3):218-231. Performed At: [address] [id number]  Laila Evans PhD VL:[id number]      Factor VIII Activity 08/09/2022 111  56 - 140 % Final    VON WILLEBRAND FACTOR (VWF) AG, 08* 08/09/2022 95  50 - 200 % Final    Comment: (NOTE)  This test was developed and its performance characteristics  determined by Mary Gonzalez. It has not been cleared or approved  by the Food and Drug Administration. VWF ACTIVITY 08/09/2022 73  50 - 200 % Final    Comment: (NOTE)  Performed At: [facility] & [address] [id number]  Bear Bartholomew MD PF:[id number]      Fibrinogen 08/09/2022 250  190 - 501 mg/dL Final    PTT 08/09/2022 22.1 (A) 24.1 - 35.1 SEC Final    Comment: Heparin Therapeutic Range = 74 - 123 seconds  When clinically evaluating a  prolonged PTT, preanalytic variables  including possible fluctuations in  levels of heparin should be considered.       Protime 08/09/2022 13.9 12.6 - 14.5 sec Final    INR 08/09/2022 1.1    Final    Comment: Suggested therapeutic INR range:  Venous thrombosis and embolus  2.0-3.0  Prosthetic heart valve         2.5-3.5  ** Note new reference range and method **      ABO/Rh 08/09/2022 A POSITIVE   Final    Homocysteine 08/09/2022 9.2  0.0 - 14.5 umol/L Final    Comment: (NOTE)  Performed At: [facility] & [address] [id number]  Neil Mohs MD VW:[id number]      WBC 08/09/2022 4.8  4.3 - 11.1 K/uL Final    RBC 08/09/2022 5.23  4.23 - 5.6 M/uL Final    Hemoglobin 08/09/2022 16.4  13.6 - 17.2 g/dL Final    Hematocrit 08/09/2022 47.2  % Final    MCV 08/09/2022 90.2  79.6 - 97.8 FL Final    MCH 08/09/2022 31.4  26.1 - 32.9 PG Final    MCHC 08/09/2022 34.7  31.4 - 35.0 g/dL Final    RDW 08/09/2022 11.6 (A) 11.9 - 14.6 % Final    Platelets 85/88/7883 210  150 - 450 K/uL Final    MPV 08/09/2022 12.0  9.4 - 12.3 FL Final    nRBC 08/09/2022 0.00  0.0 - 0.2 K/uL Final    **Note: Absolute NRBC parameter is now reported with Hemogram**    Differential Type 08/09/2022 AUTOMATED    Final    Seg Neutrophils 08/09/2022 57  43 - 78 % Final    Lymphocytes 08/09/2022 26  13 - 44 % Final    Monocytes 08/09/2022 12  4.0 - 12.0 % Final    Eosinophils % 08/09/2022 3  0.5 - 7.8 % Final    Basophils 08/09/2022 1  0.0 - 2.0 % Final    Immature Granulocytes 08/09/2022 0  0.0 - 5.0 % Final    Segs Absolute 08/09/2022 2.8  1.7 - 8.2 K/UL Final    Absolute Lymph # 08/09/2022 1.3  0.5 - 4.6 K/UL Final    Absolute Mono # 08/09/2022 0.6  0.1 - 1.3 K/UL Final    Absolute Eos # 08/09/2022 0.2  0.0 - 0.8 K/UL Final    Basophils Absolute 08/09/2022 0.1  0.0 - 0.2 K/UL Final    Absolute Immature Granulocyte 08/09/2022 0.0  0.0 - 0.5 K/UL Final    Protein C-Functional 08/09/2022 105  73 - 180 % Final    Comment: (NOTE)  Performed At: [facility] & [address] [id number]  Neil Mohs MD UU:[id number]      Sodium 08/09/2022 138  136 - 145 mmol/L Final    Potassium 08/09/2022 3.8  3.5 - 5.1 mmol/L Final    Chloride 08/09/2022 105  98 - 107 mmol/L Final    CO2 08/09/2022 27  21 - 32 mmol/L Final    Anion Gap 08/09/2022 6 (A) 7 - 16 mmol/L Final    Glucose 08/09/2022 82  65 - 100 mg/dL Final    BUN 08/09/2022 15  6 - 23 MG/DL Final    Creatinine 08/09/2022 1.10  0.8 - 1.5 MG/DL Final    GFR  08/09/2022 >60  >60 ml/min/1.73m2 Final    GFR Non- 08/09/2022 >60  >60 ml/min/1.73m2 Final    Comment:      Estimated GFR is calculated using the Modification of Diet in Renal Disease (MDRD) Study equation, reported for both  Americans (GFRAA) and non- Americans (GFRNA), and normalized to 1.73m2 body surface area. The physician must decide which value applies to the patient. The MDRD study equation should only be used in individuals age 25 or older. It has not been validated for the following: pregnant women, patients with serious comorbid conditions,or on certain medications, or persons with extremes of body size, muscle mass, or nutritional status.       Calcium 08/09/2022 9.5  8.3 - 10.4 MG/DL Final    Total Bilirubin 08/09/2022 0.9  0.2 - 1.1 MG/DL Final    ALT 08/09/2022 46  12 - 65 U/L Final    AST 08/09/2022 20  15 - 37 U/L Final    Alk Phosphatase 08/09/2022 52  50 - 136 U/L Final    Total Protein 08/09/2022 7.4  6.3 - 8.2 g/dL Final    Albumin 08/09/2022 4.2  3.5 - 5.0 g/dL Final    Globulin 08/09/2022 3.2  2.3 - 3.5 g/dL Final    Albumin/Globulin Ratio 08/09/2022 1.3  1.2 - 3.5   Final    Test Description: 08/09/2022 BCR   Final    Comment: ABL1 STANDARD P210, P190    SEND TO [facility]      Reference Lab 08/09/2022 [place]   Final    Results: 08/09/2022 DRAWN FOR [facility]   Final    Test Description: 08/09/2022 CALR   Final     SEND TO [facility]    Reference Lab 08/09/2022 NEOGENOMICS   Final    Results: 08/09/2022 DRAWN FOR [facility]   Final    Test Description: 08/09/2022 ABDIAS 2 V617F, QUALITATIVE    Final SEND TO [facility]    Reference Lab 08/09/2022 NEOGENOMICS   Final    Results: 08/09/2022 DRAWN FOR [facility]   Final    Test Description: 08/09/2022 MPL MUTATION ANALYSIS    Final     SEND TO [facility]    Reference Lab 08/09/2022 NEOGENOMICS   Final    Results: 08/09/2022 DRAWN FOR [facility]   Final         Radiology:  CT Results (most recent):  No results found for this or any previous visit from the past 365 days. PET Results (most recent):  No results found for this or any previous visit from the past 365 days. JANE Results (most recent):  No results found for this or any previous visit from the past 365 days. US  No results found for this or any previous visit from the past 365 days. 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  1) chronic DVT LUE: rule out paget schroetter (cervical rib or other abnormality causing compression)  -repeat bilateral UE US given 6 weeks since last and pt. Has prominent vasculature suggesting collateral formation; consider CTA or MRV of chest pending eval  2) thrombophilia testing recommended and discussed  3) continue ASA  -if US shows progression, switch to DOAC  -if DDIMER elevated, switch to [id number]    Reviewed symptoms of DVT and PE and advised to call if any; prevention and high risk situations discussed    Rtc 1 month    9/15/22  Repeat US negative, no evidence of acute or chronic clot  D/c aspirin  Thrombin time sl.  Elevated, advised repeating, fib normal, pt and mom declined  No fu neccesary continue PCP follow up for subq lesions if concerned  Total time 35min 50% in direct consultation about the patient's diagnosis and management  Wilbern Burkitt, MD  Director, Adolescent Young Adult [address] and Blood Disorders Program  [id number] [place] [address]  [facility], [address] [place] [address] Phone

## 2023-08-02 ENCOUNTER — HOSPITAL ENCOUNTER (OUTPATIENT)
Dept: ULTRASOUND IMAGING | Age: 23
Discharge: HOME | End: 2023-08-05
Attending: FAMILY MEDICINE
Payer: COMMERCIAL

## 2023-08-02 DIAGNOSIS — R63.4 ABNORMAL WEIGHT LOSS: ICD-10-CM

## 2023-08-02 DIAGNOSIS — E16.2 HYPOGLYCEMIA, UNSPECIFIED: ICD-10-CM

## 2023-08-02 PROCEDURE — 76700 US EXAM ABDOM COMPLETE: CPT

## 2025-06-30 SDOH — HEALTH STABILITY: PHYSICAL HEALTH: ON AVERAGE, HOW MANY MINUTES DO YOU ENGAGE IN EXERCISE AT THIS LEVEL?: 20 MIN

## 2025-06-30 SDOH — HEALTH STABILITY: PHYSICAL HEALTH: ON AVERAGE, HOW MANY DAYS PER WEEK DO YOU ENGAGE IN MODERATE TO STRENUOUS EXERCISE (LIKE A BRISK WALK)?: 1 DAY

## 2025-07-02 ENCOUNTER — OFFICE VISIT (OUTPATIENT)
Dept: FAMILY MEDICINE CLINIC | Facility: CLINIC | Age: 25
End: 2025-07-02
Payer: COMMERCIAL

## 2025-07-02 VITALS
SYSTOLIC BLOOD PRESSURE: 128 MMHG | HEART RATE: 79 BPM | HEIGHT: 68 IN | DIASTOLIC BLOOD PRESSURE: 70 MMHG | BODY MASS INDEX: 27.28 KG/M2 | OXYGEN SATURATION: 99 % | WEIGHT: 180 LBS

## 2025-07-02 DIAGNOSIS — F84.0 AUTISM: ICD-10-CM

## 2025-07-02 DIAGNOSIS — Z13.1 SCREENING FOR DIABETES MELLITUS: ICD-10-CM

## 2025-07-02 DIAGNOSIS — F90.0 ATTENTION DEFICIT HYPERACTIVITY DISORDER (ADHD), PREDOMINANTLY INATTENTIVE TYPE: ICD-10-CM

## 2025-07-02 DIAGNOSIS — Z00.00 ANNUAL PHYSICAL EXAM: Primary | ICD-10-CM

## 2025-07-02 DIAGNOSIS — E55.9 VITAMIN D DEFICIENCY: ICD-10-CM

## 2025-07-02 DIAGNOSIS — F41.1 GENERALIZED ANXIETY DISORDER: ICD-10-CM

## 2025-07-02 DIAGNOSIS — Z83.3 FAMILY HISTORY OF DIABETES MELLITUS: ICD-10-CM

## 2025-07-02 LAB
25(OH)D3 SERPL-MCNC: 60 NG/ML (ref 30–100)
ALBUMIN SERPL-MCNC: 4.2 G/DL (ref 3.5–5)
ALBUMIN/GLOB SERPL: 1.4 (ref 1–1.9)
ALP SERPL-CCNC: 53 U/L (ref 40–129)
ALT SERPL-CCNC: 37 U/L (ref 8–55)
ANION GAP SERPL CALC-SCNC: 15 MMOL/L (ref 7–16)
AST SERPL-CCNC: 22 U/L (ref 15–37)
BASOPHILS # BLD: 0.05 K/UL (ref 0–0.2)
BASOPHILS NFR BLD: 0.7 % (ref 0–2)
BILIRUB SERPL-MCNC: 1.4 MG/DL (ref 0–1.2)
BUN SERPL-MCNC: 12 MG/DL (ref 6–23)
CALCIUM SERPL-MCNC: 10 MG/DL (ref 8.8–10.2)
CHLORIDE SERPL-SCNC: 101 MMOL/L (ref 98–107)
CO2 SERPL-SCNC: 25 MMOL/L (ref 20–29)
CREAT SERPL-MCNC: 1.09 MG/DL (ref 0.8–1.3)
DIFFERENTIAL METHOD BLD: ABNORMAL
EOSINOPHIL # BLD: 0.16 K/UL (ref 0–0.8)
EOSINOPHIL NFR BLD: 2.1 % (ref 0.5–7.8)
ERYTHROCYTE [DISTWIDTH] IN BLOOD BY AUTOMATED COUNT: 11.6 % (ref 11.9–14.6)
EST. AVERAGE GLUCOSE BLD GHB EST-MCNC: 97 MG/DL
GLOBULIN SER CALC-MCNC: 3 G/DL (ref 2.3–3.5)
GLUCOSE SERPL-MCNC: 75 MG/DL (ref 70–99)
HBA1C MFR BLD: 5 % (ref 0–5.6)
HCT VFR BLD AUTO: 47.3 % (ref 41.1–50.3)
HGB BLD-MCNC: 16 G/DL (ref 13.6–17.2)
IMM GRANULOCYTES # BLD AUTO: 0.03 K/UL (ref 0–0.5)
IMM GRANULOCYTES NFR BLD AUTO: 0.4 % (ref 0–5)
LYMPHOCYTES # BLD: 1.72 K/UL (ref 0.5–4.6)
LYMPHOCYTES NFR BLD: 23.1 % (ref 13–44)
MCH RBC QN AUTO: 32.8 PG (ref 26.1–32.9)
MCHC RBC AUTO-ENTMCNC: 33.8 G/DL (ref 31.4–35)
MCV RBC AUTO: 96.9 FL (ref 82–102)
MONOCYTES # BLD: 0.65 K/UL (ref 0.1–1.3)
MONOCYTES NFR BLD: 8.7 % (ref 4–12)
NEUTS SEG # BLD: 4.85 K/UL (ref 1.7–8.2)
NEUTS SEG NFR BLD: 65 % (ref 43–78)
NRBC # BLD: 0 K/UL (ref 0–0.2)
PLATELET # BLD AUTO: 242 K/UL (ref 150–450)
PMV BLD AUTO: 12.3 FL (ref 9.4–12.3)
POTASSIUM SERPL-SCNC: 4.2 MMOL/L (ref 3.5–5.1)
PROT SERPL-MCNC: 7.3 G/DL (ref 6.3–8.2)
RBC # BLD AUTO: 4.88 M/UL (ref 4.23–5.6)
SODIUM SERPL-SCNC: 141 MMOL/L (ref 136–145)
WBC # BLD AUTO: 7.5 K/UL (ref 4.3–11.1)

## 2025-07-02 PROCEDURE — 99385 PREV VISIT NEW AGE 18-39: CPT | Performed by: PHYSICIAN ASSISTANT

## 2025-07-02 SDOH — ECONOMIC STABILITY: FOOD INSECURITY: WITHIN THE PAST 12 MONTHS, YOU WORRIED THAT YOUR FOOD WOULD RUN OUT BEFORE YOU GOT MONEY TO BUY MORE.: NEVER TRUE

## 2025-07-02 SDOH — ECONOMIC STABILITY: FOOD INSECURITY: WITHIN THE PAST 12 MONTHS, THE FOOD YOU BOUGHT JUST DIDN'T LAST AND YOU DIDN'T HAVE MONEY TO GET MORE.: NEVER TRUE

## 2025-07-02 ASSESSMENT — PATIENT HEALTH QUESTIONNAIRE - PHQ9
2. FEELING DOWN, DEPRESSED OR HOPELESS: NOT AT ALL
SUM OF ALL RESPONSES TO PHQ QUESTIONS 1-9: 0
1. LITTLE INTEREST OR PLEASURE IN DOING THINGS: NOT AT ALL
SUM OF ALL RESPONSES TO PHQ QUESTIONS 1-9: 0

## 2025-07-02 NOTE — PROGRESS NOTES
Establish Care  Name: Junaid Torres Today’s Date: 2025   MRN: [id number] Sex: Male   Age: 24 y.o. Ethnicity: Non- / Non    : 2000 Race: White (non-)      Junaid Torres is here to establish care.       Assessment & Plan   Annual physical exam  Check screening labs, referral to psychiatry as discussed.   -     CBC with Auto Differential; Future  -     Comprehensive Metabolic Panel; Future  -     Hemoglobin A1C; Future  Autism  Attention deficit hyperactivity disorder (ADHD), predominantly inattentive type  Generalized anxiety disorder  Family history of diabetes mellitus  -     Hemoglobin A1C; Future  Screening for diabetes mellitus  -     Hemoglobin A1C; Future  Vitamin D deficiency  -     Vitamin D 25 Hydroxy; Future        Return in about 1 year (around 2026).       Subjective   Junaid is here today with father to establish care. Use to follow with [facility].  He was diagnosed with autism and ADHD in his 20's, dx with ZEKE with social anxiety at age 8.   Does not take any daily medicaitions    Family Hx: father- DM, HTN; Mother prediabetic, grandparents- stroke, MI, dementia    Father needs paperwork completed so that patient is able to stay on his insurance despite age as patient is not able to hold a job due to his anxiety levels.   Associated symptoms including chest pains, palpitations, sweating, brain fog, fatigue that is brought on in social situations, feels overwhelmed easily. Patient has no issues with ADLs    Review of Systems   Constitutional:  Negative for activity change.   HENT:  Negative for congestion.    Eyes:  Negative for visual disturbance.   Respiratory:  Negative for shortness of breath.    Cardiovascular:  Negative for chest pain.   Gastrointestinal:  Negative for vomiting.   Genitourinary:  Negative for dysuria.   Musculoskeletal:  Negative for myalgias.   Skin:  Negative for rash.   Neurological:  Negative for dizziness

## 2025-07-06 ENCOUNTER — RESULTS FOLLOW-UP (OUTPATIENT)
Dept: FAMILY MEDICINE CLINIC | Facility: CLINIC | Age: 25
End: 2025-07-06

## 2025-07-08 PROBLEM — I82.629 ACUTE DEEP VEIN THROMBOSIS (DVT) OF BRACHIAL VEIN (HCC): Status: RESOLVED | Noted: 2022-08-09 | Resolved: 2025-07-08

## 2025-07-08 ASSESSMENT — ENCOUNTER SYMPTOMS
SHORTNESS OF BREATH: 0
VOMITING: 0

## 2025-07-10 ENCOUNTER — PATIENT MESSAGE (OUTPATIENT)
Dept: FAMILY MEDICINE CLINIC | Facility: CLINIC | Age: 25
End: 2025-07-10